# Patient Record
Sex: FEMALE | Race: WHITE | NOT HISPANIC OR LATINO | URBAN - METROPOLITAN AREA
[De-identification: names, ages, dates, MRNs, and addresses within clinical notes are randomized per-mention and may not be internally consistent; named-entity substitution may affect disease eponyms.]

---

## 2017-02-22 ENCOUNTER — EMERGENCY (EMERGENCY)
Facility: HOSPITAL | Age: 57
LOS: 1 days | Discharge: HOME | End: 2017-02-22
Attending: EMERGENCY MEDICINE | Admitting: EMERGENCY MEDICINE
Payer: MEDICARE

## 2017-02-22 VITALS
OXYGEN SATURATION: 99 % | RESPIRATION RATE: 18 BRPM | HEART RATE: 88 BPM | SYSTOLIC BLOOD PRESSURE: 110 MMHG | TEMPERATURE: 98 F | DIASTOLIC BLOOD PRESSURE: 80 MMHG

## 2017-02-22 DIAGNOSIS — B86 SCABIES: ICD-10-CM

## 2017-02-22 DIAGNOSIS — Z88.0 ALLERGY STATUS TO PENICILLIN: ICD-10-CM

## 2017-02-22 DIAGNOSIS — R05 COUGH: ICD-10-CM

## 2017-02-22 PROCEDURE — 99284 EMERGENCY DEPT VISIT MOD MDM: CPT | Mod: 25

## 2017-02-22 PROCEDURE — 93010 ELECTROCARDIOGRAM REPORT: CPT

## 2017-02-23 VITALS
TEMPERATURE: 99 F | DIASTOLIC BLOOD PRESSURE: 81 MMHG | SYSTOLIC BLOOD PRESSURE: 117 MMHG | RESPIRATION RATE: 16 BRPM | HEART RATE: 87 BPM | OXYGEN SATURATION: 99 %

## 2017-02-23 PROCEDURE — 99283 EMERGENCY DEPT VISIT LOW MDM: CPT | Mod: 25

## 2017-02-23 PROCEDURE — 94640 AIRWAY INHALATION TREATMENT: CPT

## 2017-02-23 PROCEDURE — 71046 X-RAY EXAM CHEST 2 VIEWS: CPT

## 2017-02-23 PROCEDURE — 93005 ELECTROCARDIOGRAM TRACING: CPT

## 2017-02-23 PROCEDURE — 71020: CPT | Mod: 26

## 2017-02-23 RX ORDER — ALBUTEROL 90 UG/1
2.5 AEROSOL, METERED ORAL ONCE
Qty: 0 | Refills: 0 | Status: COMPLETED | OUTPATIENT
Start: 2017-02-23 | End: 2017-02-23

## 2017-02-23 RX ORDER — ALBUTEROL 90 UG/1
2 AEROSOL, METERED ORAL
Qty: 1 | Refills: 0 | OUTPATIENT
Start: 2017-02-23 | End: 2017-03-02

## 2017-02-23 RX ORDER — PERMETHRIN CREAM 5% W/W 50 MG/G
1 CREAM TOPICAL
Qty: 1 | Refills: 0 | OUTPATIENT
Start: 2017-02-23

## 2017-02-23 RX ADMIN — ALBUTEROL 2.5 MILLIGRAM(S): 90 AEROSOL, METERED ORAL at 01:20

## 2017-02-23 NOTE — ED PROVIDER NOTE - MEDICAL DECISION MAKING DETAILS
cough x 2 weeks, afebrile, no respiratory distress, no retraction, speaking in full sentence, will xray, neb, reassess

## 2017-02-23 NOTE — ED PROVIDER NOTE - PROGRESS NOTE DETAILS
pt endorsed skin sx after initial nebs and xray, will prescribe permethrin cream and d/w patient findings and dx.

## 2017-02-23 NOTE — ED ADULT NURSE NOTE - OBJECTIVE STATEMENT
c/o cough, non productive, c/o cough for 3 weeks had phlegm , pt taking ciprofloxacin 500mg twice /day, reports still having cough, non productive no wheezing, no fever, no chills. clear lung sounds bilat. c/o cough for 3 weeks had phlegm , pt had x-ray and taking ciprofloxacin 500mg twice /day for 10 day, reports still having cough, non productive no wheezing, no fever, no chills. clear lung sounds bilat. c/o cough for 3 weeks had phlegm , pt had x-ray and taking ciprofloxacin 500mg twice /day for 10 day, reports still having cough, non productive no wheezing, no fever, no chills. clear lung sounds bilat. rashes in upper arm per pt she has rashes for weeks c/o cough for 3 weeks had phlegm , pt had x-ray and taking ciprofloxacin 500mg twice /day for 10 day, reports still having cough, non productive no wheezing, no fever, no chills. clear lung sounds bilat. upon discharged pt reports having rashes in upper arm per pt she has rashes for weeks

## 2017-02-23 NOTE — ED ADULT NURSE REASSESSMENT NOTE - NS ED NURSE REASSESS COMMENT FT1
pt reports feeling better after neb treatment, pt ready to go home states tomorrow she needs to be early morning, wants to be discharged  , upon discharged pt  reports having rashes under arm requesting medication for rashes. pt reports feeling better after neb treatment, pt ready to go home states tomorrow she needs to be somewhere early morning, wants to be discharged  , upon discharged pt  reports having rashes under arm requesting medication for rashes.

## 2017-02-23 NOTE — ED PROVIDER NOTE - PHYSICAL EXAMINATION
CON: ao x 3, HENMT: clear oropharynx, soft neck, HEAD: atraumatic, CV: rrr, equal pulses b/l, RESP: cta b/l, MSK: moving all extremities spontaneously CON: ao x 3, HENMT: clear oropharynx, soft neck, HEAD: atraumatic, CV: rrr, equal pulses b/l, RESP: cta b/l, MSK: moving all extremities spontaneously, SKIN: nondermatomal distribution of rash, nonvesicular, noted under b/l breasts, and b/l groin.  no lymphatic streaking, no erythema, no petechiae, no peeling, neg nikolsky sign CON: ao x 3, HENMT: clear oropharynx, soft neck, HEAD: atraumatic, CV: rrr, equal pulses b/l, RESP: cta b/l, MSK: moving all extremities spontaneously, SKIN: nondermatomal distribution of rash, nonvesicular, noted under b/l breasts, and R groin, and some scattered bite marks under b/l axilla, no lymphatic streaking, no erythema, no petechiae, no peeling, neg nikolsky sign

## 2017-02-23 NOTE — ED PROVIDER NOTE - OBJECTIVE STATEMENT
56 yof pw cough x 2 wks, w/ chest congestion.  seen by PMD, on cipro currently (finished 1 week of cipro, started on 2nd week). + sputum, no fever.  no sob, no cp, no leg swelling.  pt planned to see PMD today but the office was closed by the time she reached him.  scheduled to see PMD this coming week.  pt feels congested, requesting neb. 56 yof pw cough x 2 wks, w/ chest congestion.  seen by PMD, on cipro currently (finished 1 week of cipro, started on 2nd week). + sputum, no fever.  no sob, no cp, no leg swelling.  pt planned to see PMD today but the office was closed by the time she reached him.  scheduled to see PMD this coming week.  pt feels congested, requesting neb.  pt also subsequently reported having bites noted to various parts of her body, pruritic.  no bleeding. 56 yof pw cough x 2 wks, w/ chest congestion.  seen by PMD, on cipro currently (finished 1 week of cipro, started on 2nd week). + sputum, no fever.  no sob, no cp, no leg swelling.  pt planned to see PMD today but the office was closed by the time she reached him.  scheduled to see PMD this coming week.  pt feels congested, requesting neb.  pt also subsequently reported having bites noted to various parts of her body, pruritic, unclear when they showed up, but denied noticing them before to 2/14th. no bleeding.

## 2017-02-23 NOTE — ED ADULT NURSE NOTE - NS ED NURSE DC INFO COMPLEXITY
Simple: Patient demonstrates quick and easy understanding/Verbalized Understanding Patient asked questions/Verbalized Understanding/Simple: Patient demonstrates quick and easy understanding

## 2017-02-23 NOTE — ED ADULT NURSE NOTE - CHPI ED SYMPTOMS NEG
no headache/no diaphoresis/no edema/no chest pain/no fever/no wheezing/no chills/no shortness of breath/no body aches/no hemoptysis

## 2021-03-23 ENCOUNTER — APPOINTMENT (OUTPATIENT)
Dept: OTOLARYNGOLOGY | Facility: CLINIC | Age: 61
End: 2021-03-23

## 2021-03-26 ENCOUNTER — APPOINTMENT (OUTPATIENT)
Dept: OTOLARYNGOLOGY | Facility: CLINIC | Age: 61
End: 2021-03-26